# Patient Record
Sex: FEMALE | Race: WHITE | NOT HISPANIC OR LATINO | URBAN - METROPOLITAN AREA
[De-identification: names, ages, dates, MRNs, and addresses within clinical notes are randomized per-mention and may not be internally consistent; named-entity substitution may affect disease eponyms.]

---

## 2019-10-31 ENCOUNTER — EMERGENCY (EMERGENCY)
Facility: HOSPITAL | Age: 59
LOS: 0 days | Discharge: HOME | End: 2019-10-31
Admitting: EMERGENCY MEDICINE
Payer: MEDICAID

## 2019-10-31 VITALS
OXYGEN SATURATION: 97 % | HEART RATE: 93 BPM | TEMPERATURE: 100 F | DIASTOLIC BLOOD PRESSURE: 73 MMHG | SYSTOLIC BLOOD PRESSURE: 128 MMHG | RESPIRATION RATE: 16 BRPM

## 2019-10-31 DIAGNOSIS — K04.7 PERIAPICAL ABSCESS WITHOUT SINUS: ICD-10-CM

## 2019-10-31 DIAGNOSIS — R22.0 LOCALIZED SWELLING, MASS AND LUMP, HEAD: ICD-10-CM

## 2019-10-31 DIAGNOSIS — R59.0 LOCALIZED ENLARGED LYMPH NODES: ICD-10-CM

## 2019-10-31 DIAGNOSIS — K02.9 DENTAL CARIES, UNSPECIFIED: ICD-10-CM

## 2019-10-31 PROCEDURE — 99283 EMERGENCY DEPT VISIT LOW MDM: CPT

## 2019-10-31 RX ORDER — IBUPROFEN 200 MG
600 TABLET ORAL ONCE
Refills: 0 | Status: COMPLETED | OUTPATIENT
Start: 2019-10-31 | End: 2019-10-31

## 2019-10-31 RX ADMIN — Medication 600 MILLIGRAM(S): at 18:00

## 2019-10-31 NOTE — ED PROVIDER NOTE - NSFOLLOWUPCLINICS_GEN_ALL_ED_FT
[facility]  Dental  [address] [place] [id number]  Phone: [phone]  Fax:   Follow Up Time:

## 2019-10-31 NOTE — ED PROVIDER NOTE - NSFOLLOWUPINSTRUCTIONS_ED_ALL_ED_FT
Patient advised to continue amoxicllin, and follow up with dental clinic tomorrow to evaluate the periapical abscess.

## 2019-10-31 NOTE — ED PROVIDER NOTE - NS ED ROS FT
CONST: chills and bodyaches. No fever.   EYES: No pain, redness, drainage or visual changes.  ENT: dental pain, and maxillary pain. No ear pain or discharge, nasal discharge or congestion. No sore throat.  CARD: No chest pain, palpitations  RESP: No SOB, or cough  GI: No abdominal pain, N/V/D  SKIN: No rashes  NEURO: No headache, or dizziness

## 2019-10-31 NOTE — ED PROVIDER NOTE - PHYSICAL EXAMINATION
CONST: Well appearing in NAD  EYES: PERRL, EOMI, no pain with EOM. Sclera and conjunctiva clear.   ENT: No nasal discharge. TM's clear B/L without drainage. Oropharynx normal appearing, no erythema or exudates. Uvula midline. tenderness to palpation of the maxilla predominantly over the left side. poor dentition. .5cm indurated fluctuant periapical abscess of the gingiva superior to tooth 9 and 10. tenderness to palpation of tooth 9 and 10. no bleeding. deterioration of the gingiva noted with decaying teeth.   NECK: Non-tender, submandibular LAD.   CARD: Normal S1 S2; Normal rate and rhythm  RESP: Equal BS B/L, No wheezes, rhonchi or rales. No distress.  SKIN: Warm, dry, no acute rashes. no edema or erythema of the face.   NEURO: A&Ox3.

## 2019-10-31 NOTE — ED PROVIDER NOTE - CLINICAL SUMMARY MEDICAL DECISION MAKING FREE TEXT BOX
NADER is a 58 y/o female with no significant PMH who presents to the ED with maxillary facial swelling and constant sharp nonradiating pain that began early this morning. Patient has poor dentition. with a .5cm indurated fluctuant periapical abscess of the gingiva superior to tooth 9 and 10. tenderness to palpation of tooth 9 and 10. no bleeding. deterioration of the gingiva noted with decaying teeth. patient is advised to continue taking the amoxicillin. Patients temperatue is 99.7. patient advised to f/u with dental clinic tomorrow. patient is advised of return precautions and is agreeable to discharge.

## 2019-10-31 NOTE — ED PROVIDER NOTE - OBJECTIVE STATEMENT
NADER is a 60 y/o female with no significant PMH who presents to the ED with maxillary facial swelling and constant sharp nonradiating pain that began early this morning. Patient states since she lost her insurance she has not been able to get a dentist appoint and was recommended to come to our ED for the dental clinic. Patient states the swelling has gone down, and the pain has reduced since she started taking 500mg of amoxicillin she purchased over the counter in [place] recently. Patient states she was seen by a dentist in [facility] recently who said she has gingivitis. Patient admits to smoking. Patient states she has not taken her temperature but feels warm. Patient states she has not eaten all day due to the pain. Patient admits to chills, bodyaches, abdominal pain, n/v/d, sob, chest pain, eye pain, sore throat, cough, nasal drainage, runny nose, or rashes.

## 2019-10-31 NOTE — ED PROVIDER NOTE - PATIENT PORTAL LINK FT
You can access the FollowMyHealth Patient Portal offered by [facility] by registering at the following website: [email or web address]. By joining [facility]’s FollowMyHealth portal, you will also be able to view your health information using other applications (apps) compatible with our system.

## 2019-10-31 NOTE — ED ADULT NURSE NOTE - OBJECTIVE STATEMENT
Patient c/o gum swelling this morning. Denies difficulty breathing/ swallowing. No signs of resp distress noted at this time. On assessment no signs of redness present, lip swelling noted. Denies ear pain/headache/n/v/f/c.

## 2019-11-01 ENCOUNTER — EMERGENCY (EMERGENCY)
Facility: HOSPITAL | Age: 59
LOS: 0 days | Discharge: HOME | End: 2019-11-01
Admitting: EMERGENCY MEDICINE
Payer: MEDICAID

## 2019-11-01 VITALS
SYSTOLIC BLOOD PRESSURE: 114 MMHG | TEMPERATURE: 98 F | HEART RATE: 76 BPM | DIASTOLIC BLOOD PRESSURE: 69 MMHG | OXYGEN SATURATION: 95 % | RESPIRATION RATE: 18 BRPM

## 2019-11-01 DIAGNOSIS — K08.89 OTHER SPECIFIED DISORDERS OF TEETH AND SUPPORTING STRUCTURES: ICD-10-CM

## 2019-11-01 DIAGNOSIS — K13.79 OTHER LESIONS OF ORAL MUCOSA: ICD-10-CM

## 2019-11-01 PROCEDURE — 99282 EMERGENCY DEPT VISIT SF MDM: CPT

## 2019-11-01 NOTE — ED ADULT NURSE NOTE - OBJECTIVE STATEMENT
pt with c/o gum pain and frontal dental abscess., stated herself on amoxicillan yesterday morning, continued pain and having chills.

## 2019-11-01 NOTE — ED PROVIDER NOTE - CLINICAL SUMMARY MEDICAL DECISION MAKING FREE TEXT BOX
patient is a 59 years old F with poor dentition pain to tooth #9 and 10, follow up with dental clinic discussed

## 2019-11-01 NOTE — ED PROVIDER NOTE - NS ED ROS FT
Review of Systems    Constitutional: (-) fever  Eyes/ENT: (-) epistaxis  Cardiovascular: (-) chest pain  Respiratory:  (-) shortness of breath  Musculoskeletal: (-) neck pain  Integumentary: (-) rash  Neurological: (-) headache, (-) altered mental status

## 2019-11-01 NOTE — ED PROVIDER NOTE - PHYSICAL EXAMINATION
Gen: Alert, NAD, well appearing  ENT: normal hearing, patent oropharynx without erythema/exudate, uvula midline, poor dentition, tooth #9 and #10  Neck: +supple, no tenderness, +Trachea midline  Pulm: Bilateral BS, normal resp effort, no wheeze/stridor/retractions  CV: RRR  Skin: no rash  Neuro: AAOx3

## 2019-11-01 NOTE — CONSULT NOTE ADULT - SUBJECTIVE AND OBJECTIVE BOX
Patient is a 59y old  Female who presents with a chief complaint of swelling from tooth #9 and 10    HPI: Swelling developed two days ago. No reports of pain. Patient presented to ED this morning.    PAST MEDICAL & SURGICAL HISTORY:  No pertinent past medical or surgical history    MEDICATIONS  (STANDING): No reported medication use   MEDICATIONS  (PRN):    Allergies: No Known Allergies  Intolerances    FAMILY HISTORY: None reported    *SOCIAL HISTORY: ( -  ) Tobacco; ( -  ) ETOH    *Last Dental Visit: Over 3 years ago    Vital Signs Last 24 Hrs  T(C): 36.8 (01 Nov 2019 08:36), Max: 37.6 (31 Oct 2019 17:10)  T(F): 98.2 (01 Nov 2019 08:36), Max: 99.7 (31 Oct 2019 17:10)  HR: 76 (01 Nov 2019 08:36) (76 - 93)  BP: 114/69 (01 Nov 2019 08:36) (114/69 - 128/73)  BP(mean): --  RR: 18 (01 Nov 2019 08:36) (16 - 18)  SpO2: 95% (01 Nov 2019 08:36) (95% - 97%)    LABS:    EOE:  No signs of swelling, erythema or lymphadenopathy. No signs of trismus or dysphagia    IOE:  Sinus tract 6mm apical from marginal gingiva of tooth #9. Grade III mobility of tooth #9 and 10.     *DENTAL RADIOGRAPHS: 1 periapical with tor percha cone in sinus tract, traces to tooth #9. Severe bone loss tooth #9 and 10  RADIOLOGY & ADDITIONAL STUDIES:

## 2019-11-01 NOTE — CONSULT NOTE ADULT - ASSESSMENT
*ASSESSMENT: 58yo Female patient with no reported medical history presents with severe periodontal disease on teeth #9 and 10.     PROCEDURE:   Examination was completed. It was recommended patient extract teeth #9 and 10 at this visit. Patient opted to take antibiotics and return to have extractions completed as soon as possible. Patient understands risks of nontreatment, including swelling, erythema and severe infection.     Prescription:  - Clindamycin 150mg q6h for 7 days as needed    RECOMMENDATIONS:  1) Complete course of Clindamycin   2) Dental F/U with outpatient dentist for comprehensive dental care.   3) If any difficulty swallowing/breathing, fever occur, return to ER.     Gómez Wiggins

## 2020-02-23 NOTE — ED PROVIDER NOTE - OBJECTIVE STATEMENT
Urology Consult Patient: Itzel Zamora MRN: [id number]  SSN: [id number] YOB: 1945  Age: 69 y.o. Sex: male Date of Consultation:  February 23, 2020 Requesting Physician: Austin Sacks, MD 
Reason for Consultation:  Right renal cyst 
 
  
 
History of Present Illness:  Itzel Zamora is a 69 y.o. male admitted 2/20/2020 to the hospital for Acute respiratory failure with hypoxia ([facility] [J96.01] CHF (congestive heart failure) ([facility] [I50.9]. He was admitted with shortness of breath. He had CT angiogram performed 3 days ago which showed pulmonary embolism. He also has COPD for which pulmonary is on board. Currently on BiPAP. He was noted to have abdominal distention for which ultrasound was performed and it revealed 8 cm right renal cyst.  No complex features been noted. Past Medical History:  
Diagnosis Date  Asthma   
 hx of/has wheezing  Cancer [facility])   
 prostate - not treated yet  Cerebral artery occlusion with cerebral infarction ([facility]  Chronic obstructive pulmonary disease ([facility]  Epilepsy ([facility]  Ill-defined condition   
 shortness of breath - being evaluated  Psychiatric disorder   
 mental disability Past Surgical History:  
Procedure Laterality Date  COLONOSCOPY Left 10/4/2017 COLONOSCOPY performed by Jesenia Skaggs MD at [facility] ENDOSCOPY  
 HX INTRACRANIAL ANEURYSM REPAIR    
 HX OTHER SURGICAL    
 cyst removed from neck  HX PROSTATE SURGERY    
 HX SPLENECTOMY No Known Allergies Prior to Admission medications Medication Sig Start Date End Date Taking? Authorizing Provider  
sertraline (ZOLOFT) 25 mg tablet Take 25 mg by mouth daily. Yes Provider, Historical  
atorvastatin (LIPITOR) 40 mg tablet Take 40 mg by mouth daily. Yes Provider, Historical  
arformoteroL (BROVANA) 15 mcg/2 mL nebu neb solution 15 mcg by Nebulization route two (2) times a day.    Yes Provider, Historical  
 multivitamin (ONE A DAY) tablet Take 1 Tab by mouth daily. Yes Provider, Historical  
ferrous sulfate 325 mg (65 mg iron) tablet Take 325 mg by mouth Daily (before breakfast). Yes Provider, Historical  
fluticasone propionate (FLONASE ALLERGY RELIEF) 50 mcg/actuation nasal spray 2 Sprays by Both Nostrils route daily. Yes Provider, Historical  
hydroxyzine HCL (ATARAX) 25 mg tablet Take 25 mg by mouth two (2) times a day. Yes Provider, Historical  
ipratropium (ATROVENT) 0.02 % soln 0.5 mg by Other route every eight (8) hours. Oral inhalation   Yes Provider, Historical  
metFORMIN (GLUCOPHAGE) 500 mg tablet Take 500 mg by mouth daily (with breakfast). Yes Provider, Historical  
montelukast (SINGULAIR) 10 mg tablet Take 10 mg by mouth daily. Yes Provider, Historical  
predniSONE (DELTASONE) 10 mg tablet Take 20 mg by mouth daily (with breakfast). COPD exacerbation   Yes Provider, Historical  
budesonide-formoteroL (SYMBICORT) 160-4.5 mcg/actuation HFAA Take 2 Puffs by inhalation two (2) times a day. Yes Provider, Historical  
levalbuterol tartrate (XOPENEX) 45 mcg/actuation inhaler Take 1 Puff by inhalation every eight (8) hours. Yes Provider, Historical  
cetirizine (ZYRTEC) 10 mg tablet Take 10 mg by mouth daily. Yes Provider, Historical  
levETIRAcetam (KEPPRA) 500 mg tablet TAKE 1 TABLET BY MOUTH TWICE A DAY 11/8/19  Yes Nirali Howard DO  
pantoprazole (PROTONIX) 40 mg tablet Take 1 Tab by mouth Before breakfast and dinner. 10/6/17  Yes Wilton Callejas MD  
umeclidinium (INCRUSE ELLIPTA) 62.5 mcg/actuation inhaler Take 1 Puff by inhalation daily. Indications: Rescue inhaler   Yes Provider, Historical  
 
 
Social History Tobacco Use  Smoking status: Current Every Day Smoker  Smokeless tobacco: Never Used  Tobacco comment: cigarettes Substance Use Topics  Alcohol use: No  
  
 
Family History Problem Relation Age of Onset  Colon Cancer Maternal Aunt  Colon Cancer Maternal Aunt  Colon Cancer Maternal Aunt ROS:  Admission ROS from 2020 was reviewed and changes (other than per HPI) include: none. Physical Exam: 
 
Vital Signs:  Temp (24hrs), Av.4 °F (36.9 °C), Min:97.6 °F (36.4 °C), Max:99.8 °F (37.7 °C) Blood pressure 103/69, pulse (!) 116, temperature 99.8 °F (37.7 °C), resp. rate 18, height 6' 2\" (1.88 m), weight 75 kg (165 lb 6.4 oz), SpO2 95 %. I&O's:   07 - 1900 In: -  
Out: 200 [Urine:200] Appearance: well-developed NAD Conjunctiva/Lids: conjunctivae and lids normal  
External Ears/Nose: normal no lesions or deformities Neck: supple Respiratory Effort: On BiPAP machine Abdomen/Flank: Soft, mild distention Gait/Station: normal  
Skin Inspection: warm and dry Mood/Affect: normal 
 
 
Lab Review: CBC Recent Labs  
  20 
0853 20 
0245 20 
1333 WBC 9.5 4.7 7.3 HGB 11.8* 12.3 13.3 HCT 40.7 41.3 46.3  293 337 CMP Recent Labs  
  20 
0853 20 
0245 20 
1333  136 136  
K 3.7 4.2 4.7 CL 91* 97 95* CO2 40* 37* 39* * 106* 170* BUN 17 10 10 CREA 0.79 0.51* 0.66* CA 9.5 9.8 10.5* MG  --  2.0 2.1 PHOS  --  3.6 4.0 ALB  --  2.6* 3.4* TBILI  --  0.3 0.4 SGOT  --  12* 10* ALT  --  19 24 Other No results for input(s): INR, PSA, INREXT in the last 72 hours. No lab exists for component: PTT 
 
UA:  
Lab Results Component Value Date/Time  Color YELLOW/STRAW 09/10/2017 10:09 PM  
 Appearance CLEAR 09/10/2017 10:09 PM  
 Specific gravity 1.010 09/10/2017 10:09 PM  
 pH (UA) 5.0 09/10/2017 10:09 PM  
 Protein NEGATIVE  09/10/2017 10:09 PM  
 Glucose NEGATIVE  09/10/2017 10:09 PM  
 Ketone NEGATIVE  09/10/2017 10:09 PM  
 Bilirubin NEGATIVE  09/10/2017 10:09 PM  
 Urobilinogen 0.2 09/10/2017 10:09 PM  
 Nitrites NEGATIVE  09/10/2017 10:09 PM  
 Leukocyte Esterase NEGATIVE  09/10/2017 10:09 PM  
 Epithelial cells MODERATE (A) 09/10/2017 10:09 PM  
 Bacteria NEGATIVE  09/10/2017 10:09 PM  
 WBC 5-10 09/10/2017 10:09 PM  
 RBC 0-5 09/10/2017 10:09 PM  
 
 
Imaging: Ultrasound reviewed. Cyst appears simple however I am unable to make an accurate determination with this study Assessment:  
 
Active Problems: 
  CHF (congestive heart failure) ([facility] (2/20/2020) Acute respiratory failure with hypoxia ([facility] (2/20/2020) Plan: 1. Renal cyst is likely simple based upon the report. Once he recovers from acute illness he will require outpatient follow-up in office for CT renal mass protocol. Regardless, renal cyst is not contributing to abdominal distention or any symptoms at this time. Please call with further questions. Thank you for allowing us to participate. Signed By: Rudy Canales MD  - February 23, 2020 Patient is a 59 years old F no pertinent pmhx presents to the ED for evaluation of toothache x2 days, no fever, no chills, no drooling, no difficulty with swallowing or breathing.  No cp, no shortness of breath.

## 2022-06-16 PROBLEM — Z78.9 OTHER SPECIFIED HEALTH STATUS: Chronic | Status: ACTIVE | Noted: 2019-11-01

## 2022-06-21 PROBLEM — Z00.00 ENCOUNTER FOR PREVENTIVE HEALTH EXAMINATION: Status: ACTIVE | Noted: 2022-06-21

## 2022-06-28 ENCOUNTER — APPOINTMENT (OUTPATIENT)
Dept: ULTRASOUND IMAGING | Facility: CLINIC | Age: 62
End: 2022-06-28

## 2022-06-28 PROCEDURE — 76856 US EXAM PELVIC COMPLETE: CPT

## 2022-06-28 PROCEDURE — 76700 US EXAM ABDOM COMPLETE: CPT
